# Patient Record
Sex: MALE | Race: WHITE | NOT HISPANIC OR LATINO
[De-identification: names, ages, dates, MRNs, and addresses within clinical notes are randomized per-mention and may not be internally consistent; named-entity substitution may affect disease eponyms.]

---

## 2022-12-06 ENCOUNTER — APPOINTMENT (OUTPATIENT)
Dept: ORTHOPEDIC SURGERY | Facility: CLINIC | Age: 27
End: 2022-12-06

## 2022-12-06 VITALS
WEIGHT: 210 LBS | BODY MASS INDEX: 29.4 KG/M2 | SYSTOLIC BLOOD PRESSURE: 163 MMHG | OXYGEN SATURATION: 98 % | HEART RATE: 104 BPM | TEMPERATURE: 97.7 F | HEIGHT: 71 IN | DIASTOLIC BLOOD PRESSURE: 80 MMHG

## 2022-12-06 DIAGNOSIS — M25.511 PAIN IN RIGHT SHOULDER: ICD-10-CM

## 2022-12-06 DIAGNOSIS — M77.01 MEDIAL EPICONDYLITIS, RIGHT ELBOW: ICD-10-CM

## 2022-12-06 DIAGNOSIS — M25.512 PAIN IN RIGHT SHOULDER: ICD-10-CM

## 2022-12-06 PROCEDURE — 73080 X-RAY EXAM OF ELBOW: CPT | Mod: RT

## 2022-12-06 PROCEDURE — 99203 OFFICE O/P NEW LOW 30 MIN: CPT

## 2022-12-06 PROCEDURE — 73030 X-RAY EXAM OF SHOULDER: CPT | Mod: 50

## 2022-12-06 NOTE — PHYSICAL EXAM
[Rad] : radial 2+ and symmetric bilaterally [Normal] : Alert and in no acute distress [Poor Appearance] : well-appearing [Acute Distress] : not in acute distress [Obese] : not obese [de-identified] : The patient has no respiratory distress. Mood and affect are normal. The patient is alert and oriented to person, place and time.\par Examination of the cervical spine demonstrates no tenderness, no deformity and no muscle spasm. Cervical spine rotation is 60° to the right, 60° to the left, 75° of extension and 45° of flexion. Neurologic exam of the upper extremities reveals intact sensation to light touch. Motor function is 5 over 5 in all groups. Deep tendon reflexes are 2+ and equal at the biceps, triceps and brachioradialis.\par Examination of the shoulders demonstrates no deformity.  There is no tenderness.  There is no instability.  Drop arm test is negative.  Impingement is negative.  He has elevation of 160 degrees bilaterally, external rotation of 45 degrees bilaterally and internal rotation to the mid lumbar level bilaterally.  The elbows are stable.  There is mild medial epicondylar tenderness at the right elbow.  There is mild increase in medial right elbow pain with wrist extension.  The wrist is nontender.  The skin is intact.  There is no lymphedema. [de-identified] : AP, lateral and oblique x-rays of the right elbow demonstrate no fracture, no dislocation and no bony abnormality.  AP, transscapular and axillary x-rays of both shoulders taken today demonstrate no fracture or dislocation.  There is a small calcification in the rotator cuff area of the right shoulder.

## 2022-12-06 NOTE — DISCUSSION/SUMMARY
[de-identified] : The patient has pain in his shoulder secondary to overuse and exercise.  Similarly he has medial epicondylitis for the same reason.  I have discussed the pathology, natural history and treatment options with him.  I have explained that he does have a small calcification in his right shoulder as well.  Treatment will be activity modification.  He is not in significant pain and requires no medication.  If symptoms worsen he will be reevaluated.

## 2022-12-06 NOTE — HISTORY OF PRESENT ILLNESS
[de-identified] : 27 year old RHD male presents for initial evaluation of bilateral shoulder and right elbow pain x 1.5 months. He denies any trauma or injury. He complains of intermittent weakness on the R side when lifting weights, particularly with shoulder presses and bicep curls. He localizes this discomfort to the anterior aspect of both shoulders, and medial aspect of the right elbow. Denies prior injury.